# Patient Record
Sex: MALE | Race: WHITE | Employment: FULL TIME | URBAN - METROPOLITAN AREA
[De-identification: names, ages, dates, MRNs, and addresses within clinical notes are randomized per-mention and may not be internally consistent; named-entity substitution may affect disease eponyms.]

---

## 2018-01-26 PROBLEM — Z00.00 ROUTINE GENERAL MEDICAL EXAMINATION AT A HEALTH CARE FACILITY: Status: ACTIVE | Noted: 2018-01-26

## 2018-11-27 PROCEDURE — 81001 URINALYSIS AUTO W/SCOPE: CPT | Performed by: FAMILY MEDICINE

## 2019-01-02 ENCOUNTER — OFFICE VISIT (OUTPATIENT)
Dept: FAMILY MEDICINE CLINIC | Facility: CLINIC | Age: 34
End: 2019-01-02
Payer: COMMERCIAL

## 2019-01-02 VITALS
OXYGEN SATURATION: 98 % | BODY MASS INDEX: 23.7 KG/M2 | TEMPERATURE: 98 F | SYSTOLIC BLOOD PRESSURE: 90 MMHG | HEIGHT: 69 IN | DIASTOLIC BLOOD PRESSURE: 60 MMHG | HEART RATE: 73 BPM | RESPIRATION RATE: 20 BRPM | WEIGHT: 160 LBS

## 2019-01-02 DIAGNOSIS — J01.40 ACUTE PANSINUSITIS, RECURRENCE NOT SPECIFIED: Primary | ICD-10-CM

## 2019-01-02 PROBLEM — J01.90 ACUTE SINUSITIS: Status: ACTIVE | Noted: 2019-01-02

## 2019-01-02 PROCEDURE — 99203 OFFICE O/P NEW LOW 30 MIN: CPT | Performed by: NURSE PRACTITIONER

## 2019-01-02 RX ORDER — AMOXICILLIN 875 MG/1
875 TABLET, COATED ORAL 2 TIMES DAILY
Qty: 20 TABLET | Refills: 0 | Status: SHIPPED | OUTPATIENT
Start: 2019-01-02 | End: 2019-01-12

## 2019-01-02 RX ORDER — FLUTICASONE PROPIONATE 50 MCG
2 SPRAY, SUSPENSION (ML) NASAL DAILY
Qty: 1 INHALER | Refills: 0 | Status: SHIPPED | OUTPATIENT
Start: 2019-01-02 | End: 2019-05-13 | Stop reason: ALTCHOICE

## 2019-01-02 NOTE — PROGRESS NOTES
CHIEF COMPLAINT:   Patient presents with:  Sore Throat  Sinus Problem      HPI:   Shiraz Loyd is a 29year old male who presents for cold symptoms for  10  days. Symptoms have progressed into sinus congestion and been worsening since onset.  Sinu turbinates. THROAT: oral mucosa pink, moist. No visible dental caries. Posterior pharynx is slightly erythematous. NECK: supple, non-tender  LUNGS: Breathing is non labored. Lungs clear to auscultation bilaterally, no wheezes or rhonchi.    CARDIO: RRR wi

## 2019-01-04 PROBLEM — E78.00 PURE HYPERCHOLESTEROLEMIA: Status: ACTIVE | Noted: 2019-01-04

## 2019-01-04 PROBLEM — E55.9 VITAMIN D DEFICIENCY: Status: ACTIVE | Noted: 2019-01-04

## 2019-05-13 ENCOUNTER — OFFICE VISIT (OUTPATIENT)
Dept: FAMILY MEDICINE CLINIC | Facility: CLINIC | Age: 34
End: 2019-05-13
Payer: COMMERCIAL

## 2019-05-13 VITALS
TEMPERATURE: 98 F | RESPIRATION RATE: 14 BRPM | DIASTOLIC BLOOD PRESSURE: 70 MMHG | HEIGHT: 69 IN | WEIGHT: 162.38 LBS | BODY MASS INDEX: 24.05 KG/M2 | OXYGEN SATURATION: 98 % | HEART RATE: 71 BPM | SYSTOLIC BLOOD PRESSURE: 104 MMHG

## 2019-05-13 DIAGNOSIS — A08.4 VIRAL GASTROENTERITIS: Primary | ICD-10-CM

## 2019-05-13 DIAGNOSIS — T14.8XXA MUSCLE STRAIN: ICD-10-CM

## 2019-05-13 PROCEDURE — 99213 OFFICE O/P EST LOW 20 MIN: CPT | Performed by: NURSE PRACTITIONER

## 2019-05-13 NOTE — PATIENT INSTRUCTIONS
Rest and fluids  May try probiotics as packet insert  Brat diet  May use Gatorade or Oral rehydration   Pepcid as packet insert for upset stomach    Once feeling better may use Advil or tylenol as packet insert for muscle strain  Mild stretching       Foll after.  · Wash your hands after using cutting boards, countertops, knives, or utensils that have been in contact with raw food. · Dry your hands with a single use towel. · Keep uncooked meats away from cooked and ready-to-eat foods.   Medicine  You may us (the second day), you may add the following to the above:  · Hot cereal, plain toast, bread, rolls, and crackers  · Plain noodles, rice, mashed potatoes, chicken noodle or rice soup  · Unsweetened canned fruit (avoid pineapple), bananas  · Limit fat intake 36527. All rights reserved. This information is not intended as a substitute for professional medical care. Always follow your healthcare professional's instructions.

## 2019-05-13 NOTE — PROGRESS NOTES
CHIEF COMPLAINT:   Patient presents with:  Diarrhea: x 1 day, upset stomach x 2 days         HPI:   Prabhu Ramírez is a 29year old male who presents for complaints of diarrhea for 1 day, upset stomach for 2 days.   Reports generalized upset stoma CARDIOVASCULAR: denies chest pain or palpitations  RESPIRATORY: denies shortness of breath, cough or wheezing  MUSCULAR: reports sore muscle to left chest. Sore with palpation.    GI:See HPI  NEURO: denies headaches, loss of bowel or bladder control    EXAM Mild stretching. May use tylenol or Advil once stomach feels better. Follow-up if not improving. Discussed red flags and when to go to the ER or call 911.          Requested Prescriptions      No prescriptions requested or ordered in this encounter · Wash your hands or use alcohol-based  after using the toilet and before meals. Clean the toilet after each use. Remember these tips when preparing food:  · People with diarrhea should not prepare or serve food to others.  When preparing foods, w · Beverages. Sports drinks, soft drinks without caffeine, ginger ale, mineral water (plain or flavored), decaffeinated tea and coffee. If you are very dehydrated, sports drinks aren't a good choice. They have too much sugar and not enough electrolytes.  In · Abdominal pain that gets worse  · Continued vomiting (unable to keep liquids down)  · Frequent diarrhea (more than 5 times a day)  · Blood in vomit or stool (black or red color)  · Dark urine, reduced urine output, or extreme thirst  · Weakness or dizzin

## 2019-07-13 ENCOUNTER — OFFICE VISIT (OUTPATIENT)
Dept: FAMILY MEDICINE CLINIC | Facility: CLINIC | Age: 34
End: 2019-07-13
Payer: COMMERCIAL

## 2019-07-13 VITALS
DIASTOLIC BLOOD PRESSURE: 66 MMHG | HEART RATE: 68 BPM | TEMPERATURE: 98 F | SYSTOLIC BLOOD PRESSURE: 108 MMHG | RESPIRATION RATE: 16 BRPM

## 2019-07-13 DIAGNOSIS — J01.00 ACUTE NON-RECURRENT MAXILLARY SINUSITIS: Primary | ICD-10-CM

## 2019-07-13 PROCEDURE — 99213 OFFICE O/P EST LOW 20 MIN: CPT | Performed by: NURSE PRACTITIONER

## 2019-07-13 RX ORDER — AMOXICILLIN AND CLAVULANATE POTASSIUM 875; 125 MG/1; MG/1
1 TABLET, FILM COATED ORAL 2 TIMES DAILY
Qty: 20 TABLET | Refills: 0 | Status: SHIPPED | OUTPATIENT
Start: 2019-07-13 | End: 2019-07-23

## 2019-07-13 NOTE — PROGRESS NOTES
CHIEF COMPLAINT:   \" Patient presents with:  Sinus Problem  Sore Throat    HPI:   John Baca is a 36year old male who presents with Frontal and Maxillary sinus congestion and pressure and a very sore throat.   Pt has been blowing out thick yel pharynx is erythematous. No exudates. Tonsils 1+/4. NECK: Supple, non-tender  LUNGS: clear to auscultation bilaterally, no wheezes or rhonchi. Breathing is non labored. CARDIO: RRR without murmur  LYMPH:  No lymphadenopathy.       ASSESSMENT AND PLAN:

## 2019-07-13 NOTE — PATIENT INSTRUCTIONS
Sinusitis (Antibiotic Treatment)    The sinuses are air-filled spaces within the bones of the face. They connect to the inside of the nose. Sinusitis is an inflammation of the tissue that lines the sinuses. Sinusitis can occur during a cold.  It can also · Do not use nasal rinses or irrigation during an acute sinus infection, unless your healthcare provider tells you to. Rinsing may spread the infection to other areas in your sinuses.   · Use acetaminophen or ibuprofen to control pain, unless another pain m Sore throats happen for many reasons, such as colds, allergies, and infections caused by viruses or bacteria. In any case, your throat becomes red and sore.  Your goal for self-care is to reduce your discomfort while giving your throat a chance to heal.  Mo · A temperature over 101°F (38.3°C)  · White spots on the throat  · Great difficulty swallowing  · Trouble breathing  · A skin rash  · Recent exposure to someone else with strep bacteria  · Severe hoarseness and swollen glands in the neck or jaw   [place]

## 2019-12-10 ENCOUNTER — OFFICE VISIT (OUTPATIENT)
Dept: FAMILY MEDICINE CLINIC | Facility: CLINIC | Age: 34
End: 2019-12-10
Payer: COMMERCIAL

## 2019-12-10 VITALS
BODY MASS INDEX: 24.44 KG/M2 | SYSTOLIC BLOOD PRESSURE: 110 MMHG | OXYGEN SATURATION: 99 % | RESPIRATION RATE: 20 BRPM | DIASTOLIC BLOOD PRESSURE: 78 MMHG | HEIGHT: 69 IN | HEART RATE: 66 BPM | TEMPERATURE: 97 F | WEIGHT: 165 LBS

## 2019-12-10 DIAGNOSIS — J02.9 PHARYNGITIS, UNSPECIFIED ETIOLOGY: Primary | ICD-10-CM

## 2019-12-10 PROCEDURE — 87880 STREP A ASSAY W/OPTIC: CPT | Performed by: NURSE PRACTITIONER

## 2019-12-10 PROCEDURE — 99213 OFFICE O/P EST LOW 20 MIN: CPT | Performed by: NURSE PRACTITIONER

## 2019-12-10 NOTE — PROGRESS NOTES
CHIEF COMPLAINT:   Patient presents with:  Sore Throat        HPI:   Kasandra Sanchez is a 36year old male presents to clinic with complaint of sore throat. Patient has had 2 days. Symptoms have been worsening since onset.   Patient reports followi submandibular lymphadenopathy. No posterior cervical or occipital lymphadenopathy.     Recent Results (from the past 24 hour(s))   STREP A ASSAY W/OPTIC    Collection Time: 12/10/19  9:56 AM   Result Value Ref Range    Strep Grp A Screen Negative Negative

## 2022-02-14 PROBLEM — M25.562 CHRONIC PAIN OF BOTH KNEES: Status: ACTIVE | Noted: 2022-02-14

## 2022-02-14 PROBLEM — R09.89 CHRONIC THROAT CLEARING: Status: ACTIVE | Noted: 2022-02-14

## 2022-02-14 PROBLEM — G89.29 CHRONIC PAIN OF BOTH KNEES: Status: ACTIVE | Noted: 2022-02-14

## 2022-02-14 PROBLEM — M25.561 CHRONIC PAIN OF BOTH KNEES: Status: ACTIVE | Noted: 2022-02-14

## 2024-11-27 ENCOUNTER — OFFICE VISIT (OUTPATIENT)
Dept: FAMILY MEDICINE CLINIC | Facility: CLINIC | Age: 39
End: 2024-11-27
Payer: COMMERCIAL

## 2024-11-27 VITALS
WEIGHT: 185 LBS | HEIGHT: 69.5 IN | OXYGEN SATURATION: 98 % | TEMPERATURE: 98 F | RESPIRATION RATE: 16 BRPM | DIASTOLIC BLOOD PRESSURE: 78 MMHG | SYSTOLIC BLOOD PRESSURE: 115 MMHG | BODY MASS INDEX: 26.78 KG/M2 | HEART RATE: 90 BPM

## 2024-11-27 DIAGNOSIS — J01.90 ACUTE NON-RECURRENT SINUSITIS, UNSPECIFIED LOCATION: Primary | ICD-10-CM

## 2024-11-27 PROCEDURE — 99202 OFFICE O/P NEW SF 15 MIN: CPT | Performed by: PHYSICIAN ASSISTANT

## 2024-11-27 NOTE — PROGRESS NOTES
CHIEF COMPLAINT:     Chief Complaint   Patient presents with    Sinus Problem     Sx started 1 week ago, facial pain/headaches, sinus pressure, congestion, ST, productive coughing  Patient denies fever, body aches,  Home covid test was negative 2 days ago  OTC mucinex       HPI:   Rao Cannon is a 39 year old male who presents with facial pressure mid face.  Symptoms for a week.  Feels like has a sinus infection.      Associated symptoms:    Fever/Chills  No  Sore throat  Yes  Cough  Yes   Congestion Yes  Bodyache  Yes  mild  Headache  Yes  Chest pain No  SOB/Dyspnea No  Loss of taste No  Loss of smell No  Diarrhea No  Vomiting No    Covid test negative at home.         Current Outpatient Medications   Medication Sig Dispense Refill    amoxicillin clavulanate 875-125 MG Oral Tab Take 1 tablet by mouth 2 (two) times daily for 10 days. 20 tablet 0      No past medical history on file.   Social History:  Social History     Socioeconomic History    Marital status: Single    Number of children: 0   Occupational History    Occupation: senior [employer]   Tobacco Use    Smoking status: Never    Smokeless tobacco: Never   Vaping Use    Vaping status: Never Used   Substance and Sexual Activity    Alcohol use: Yes    Drug use: No   Other Topics Concern     Service No    Blood Transfusions No    Caffeine Concern Yes    Occupational Exposure No    Hobby Hazards No    Sleep Concern No    Stress Concern No    Weight Concern No    Special Diet No    Back Care No    Exercise Yes    Seat Belt Yes   Social History Narrative    Likes to exercise    likes to play pool    Likes to listen to music    Likes to golf     Social Drivers of Health     Financial Resource Strain: Low Risk  (1/19/2021)    Received from [facility]    Overall Financial Resource Strain (CARDIA)     Difficulty of Paying Living Expenses: Not hard at all   Food Insecurity: No Food Insecurity (1/19/2021)    Received from [facility]     Hunger Vital Sign     Worried About Running Out of Food in the Last Year: Never true     Ran Out of Food in the Last Year: Never true   Transportation Needs: No Transportation Needs (1/19/2021)    Received from [facility]     Lack of Transportation (Medical): No     Lack of Transportation (Non-Medical): No    Received from [facility]    Social Connections        Review of Systems:    Positive for stated complaint: sinus.   Pertinent positives and negatives noted in the the HPI.    EXAM:   /78 (BP Location: Right arm, Patient Position: Sitting, Cuff Size: adult)   Pulse 90   Temp 97.8 °F (36.6 °C) (Oral)   Resp 16   Ht 5' 9.5\" (1.765 m)   Wt 185 lb (83.9 kg)   SpO2 98%   BMI 26.93 kg/m²   GENERAL: well developed, well nourished,in no apparent distress  SKIN: no rashes,no suspicious lesions  HEAD: atraumatic, normocephalic  EYES: conjunctiva clear, sclera white,  PERRLA  EARS: TM's non erythematous  NOSE: nares patent, mucosa moderate congestion.  Tenderness over frontal sinuses  THROAT: Posterior pharynx is mildy erythematous  NECK: supple, non-tender  LUNGS: clear to auscultation bilaterally without rale, ronchi, wheeze.  CARDIO: S1/S2 without murmur  EXTREMITIES: no cyanosis, clubbing or edema  LYMPH:  no gross lymphadenopathy.      No results found for this or any previous visit (from the past 24 hours).      ASSESSMENT AND PLAN:   Rao Cannon is a 39 year old male who presents with Sinus Problem (Sx started 1 week ago, facial pain/headaches, sinus pressure, congestion, ST, productive coughing/Patient denies fever, body aches,/Home covid test was negative 2 days ago/OTC mucinex). Symptoms are consistent with:      ASSESSMENT:  Encounter Diagnosis   Name Primary?    Acute non-recurrent sinusitis, unspecified location Yes       PLAN    Discussed  viral vs bacterial etiologies.  Symptoms escalating after a week.  Trial augmentin as  written.  Flonase, decongestant/expectorant/cough suppression.  Contagion reviewed       Symptomatic care:   1. Rest. Drink plenty of fluids.  2. Tylenol or ibuprofen for discomfort or fever.   3. OTC decongestant (phenylephrine) expectorants (guaifenesin), nasal steroid sprays  (fluticasone) may be helpful        for congestion.  4. OTC cough suppressant for cough  (dextromethorphan)  5. Chloraseptic spray/throat lozenges for sore throat   6 augmentin as written     Go to the ED for evaluation with progressive symptoms of difficulty swallowing, breathing, shortness of breath, chest pain, extreme weakness, or confusion.         Meds & Refills for this Visit:  Requested Prescriptions     Signed Prescriptions Disp Refills    amoxicillin clavulanate 875-125 MG Oral Tab 20 tablet 0     Sig: Take 1 tablet by mouth 2 (two) times daily for 10 days.       Risks, benefits, side effects of medication addressed and explained.    There are no Patient Instructions on file for this visit.    The patient indicates understanding of these issues and agrees to the plan.  The patient is asked to follow up PCP